# Patient Record
Sex: FEMALE | Race: WHITE | Employment: UNEMPLOYED | URBAN - METROPOLITAN AREA
[De-identification: names, ages, dates, MRNs, and addresses within clinical notes are randomized per-mention and may not be internally consistent; named-entity substitution may affect disease eponyms.]

---

## 2023-10-17 ENCOUNTER — HOSPITAL ENCOUNTER (OUTPATIENT)
Age: 53
Setting detail: THERAPIES SERIES
Discharge: HOME | End: 2023-10-17
Payer: MEDICAID

## 2023-10-17 PROCEDURE — 97165 OT EVAL LOW COMPLEX 30 MIN: CPT

## 2023-10-17 PROCEDURE — 97110 THERAPEUTIC EXERCISES: CPT

## 2023-10-17 NOTE — CONSULTS
compression  [id number]                [x]  ADL training  02357  []  Ultrasound        [id number]    []  Neuromuscular Re-education  [id number]  []  Electrical Stimulation Attended  [id number]    [x]  Manual Therapy  17076  Orthotic    []  Fit  [id number]     []  Train [id number]    [x]  Instruction in HEP        Prosthetic    []  Fit [id number]      []  Train [id number]    []  Cognitive Interventions, (first 15 min 29192, subsequent 15 min 31664)  [x]  Cold/hotpack      []  Massage   42798           []  Medication allergies reviewed for use of    Dexamethasone Sodium Phosphate 4mg/ml     with iontophoresis treatments. Pt is not allergic. Treatment Plan:  Frequency: 2 x/week for 20 visits     Today's Treatment:  Modalities:n/a       Exercise reps resistance comments completed                 Tendon glides 3x10 AROM  X   Chin tuck 3x10 AROM  X   Putty squeeze and pinches 3x10 tan  X     Other:   Access Code: [email or web address]  URL: [email or web address]  Date: 10/17/2023  Prepared by: Jarvis Duckworth    Specific Instructions for Next Treatment:     Evaluation Complexity:  History (Personal factors, comorbidities) [x]  0 []  1-2 []  3+   Exam (limitations, restrictions) [x]  1-2 []  3 []  4+   Decision Making [x]  Low []  Moderate []  High   ? [x]  Low Complexity []  Moderate   Complexity []  High Complexity      Treatment Charges: Mins Units Time In/Out   Evaluation  []  High  []  Moderate  [x]  Low  35  1    [x]  Ther Exercise 15 1    []  Manual Therapy      []  Ther Activities      []  Orthotic fit/train      []  Orthotic recheck      []        Total Treatment time 50 min       Time In: 1500   Time out: 1550     Electronically signed by Jarvis Duckworth OT on 10/17/2023 at 2:05 PM    Physician Signature: _________________________ Date: _______________  By signing above or cosigning this note, I have reviewed this plan of care and certify a need for medically necessary rehabilitation services.      *PLEASE SIGN ABOVE AND FAX BACK ALL PAGES*

## 2023-10-20 ENCOUNTER — HOSPITAL ENCOUNTER (OUTPATIENT)
Age: 53
Setting detail: THERAPIES SERIES
Discharge: HOME | End: 2023-10-20
Payer: MEDICAID

## 2023-10-20 PROCEDURE — 97110 THERAPEUTIC EXERCISES: CPT

## 2023-10-20 NOTE — FLOWSHEET NOTE
[] [facility] ([facility]) [facility] CENTER &  Therapy  [address].  [phone]  F: [phone] [] 204 [facility]  [address]   [address]  P: [phone]  F: [phone] [] [place]  P: [phone]  F: [phone] [x] [place].   P: [phone]  F: [phone] [] [address] [facility] [address]  [address]  [place]: [phone]   F: [phone]     Occupational Therapy Daily Treatment Note    Date:  10/20/2023  Patient Name:  Carlin Acevedo    :  1970  MRN: [id number]  Referring Provider:  ARMANI Laura NP   Insurance: Other [facility], auth needed after 30 visits  Medical Diagnosis: right hand and thumb weakness. R29.898        Rehab Codes: numbness R20.2,, pain in right hand M79.641,, or pain in right finger(s) M79.644,  Onset Date:  (3 years ago)                      Next  [address] [place] [address]: none scheduled at this time  Visit# / total visits: ; Progress note for Medicare patient due at visit 10    Cancels/No Shows: 0/0      Subjective:    Pain:  [x] Yes  [] No Location:  right hand Pain Rating: (0-10 scale) 2/10  Pain altered Tx:  [] No  [x] Yes  Action: breaks as needed with ex's  Pt Comments: Patient told OT that she sleeps with right hand in fist/ flexed and pushed against her trunk all night. She has been doing this for 30 years. Precautions: n/a    Objective:   Today's Treatment:  Modalities:n/a        Exercise reps resistance comments completed    clips  1x up and down Yellow-black     X    flexbar  3x10  red Ext/flex/sup/pron/UD/RD  X    Tendon glides 3x10 AROM   X   Chin tuck 3x10 AROM   X   Putty squeeze and pinches 3x10 tan   X   web 3x10 red Push, squeeze, twist X      Other:

## 2023-10-25 ENCOUNTER — APPOINTMENT (OUTPATIENT)
Age: 53
End: 2023-10-25
Payer: MEDICAID

## 2023-10-27 ENCOUNTER — HOSPITAL ENCOUNTER (OUTPATIENT)
Age: 53
Setting detail: THERAPIES SERIES
Discharge: HOME | End: 2023-10-27
Payer: MEDICAID

## 2023-10-27 PROCEDURE — 97022 WHIRLPOOL THERAPY: CPT

## 2023-10-27 PROCEDURE — 97110 THERAPEUTIC EXERCISES: CPT

## 2023-10-27 NOTE — FLOWSHEET NOTE
[] [facility] ([facility]) -   [facility] CENTER &  Therapy  [address] [place] [address].  [phone]  F: [phone] [] [address] [facility]  [address]   [address]  P: [phone]  F: [phone] [] [place]  P: [phone]  F: [phone] [x] [place].   P: [phone]  F: [phone] [] [address] [facility] [address]  [address]  [place]: [phone]   F: [phone]     Occupational Therapy Daily Treatment Note    Date:  10/27/2023  Patient Name:  Bg Hernandez    :  1970  MRN: [id number]  Referring Provider:  ARMANI Reilly NP   Insurance: Other [facility], auth needed after 30 visits  Medical Diagnosis: right hand and thumb weakness. R29.898        Rehab Codes: numbness R20.2,, pain in right hand M79.641,, or pain in right finger(s) M79.644,  Onset Date:  (3 years ago)                      Next Dr. Laz Shay: none scheduled at this time  Visit# / total visits: 3/20; Progress note for Medicare patient due at visit 10    Cancels/No Shows: 0/0      Subjective:    Pain:  [x] Yes  [] No Location:  right hand Pain Rating: (0-10 scale) 3/10  Pain altered Tx:  [] No  [x] Yes  Action: Fluido therapy,  breaks as needed with ex's  Pt Comments: Reports increased soreness after last treatment session. Did resolve after 1-2 days. Patient states that she is not pushing right fist into trunk while sleeping, she uses LUE now. Precautions: n/a    Objective:   Today's Treatment:  Modalities: fluido therapy to RUE      Exercise reps resistance comments completed    clips  1x up and down Yellow-black     X    flexbar  3x10  red Ext/flex/sup/pron/UD/RD  X    Tendon glides 3x10 AROM HEP  -   Chin tuck 3x10 AROM  HEP -   Putty squeeze

## 2023-11-01 ENCOUNTER — HOSPITAL ENCOUNTER (OUTPATIENT)
Age: 53
Setting detail: THERAPIES SERIES
Discharge: HOME | End: 2023-11-01
Payer: MEDICAID

## 2023-11-01 PROCEDURE — 97022 WHIRLPOOL THERAPY: CPT

## 2023-11-01 PROCEDURE — 97110 THERAPEUTIC EXERCISES: CPT

## 2023-11-01 NOTE — FLOWSHEET NOTE
[] [address] CENTER &  Therapy  [address] [facility] [place] [facility].  [phone]  F: [phone] [] 204 [facility]  [address]   [address]  P: [phone]  F: [phone] [] [place]  P: [phone]  F: [phone] [x] [place].   P: [phone]  F: [phone] [] [address] [facility]  [address]  [place]: [phone]   F: [phone]     Occupational Therapy Daily Treatment Note    Date:  2023  Patient Name:  Timbo Tomlinson    :  1970  MRN: [id number]  Referring Provider:  ARMANI Torrez NP   Insurance: Other [facility], auth needed after 30 visits  Medical Diagnosis: right hand and thumb weakness. R29.898        Rehab Codes: numbness R20.2,, pain in right hand M79.641,, or pain in right finger(s) M79.644,  Onset Date:  (3 years ago)                      Next Dr. Celestine Davison: none scheduled at this time  Visit# / total visits: ; Progress note for Medicare patient due at visit 10    Cancels/No Shows: 0/0      Subjective:    Pain:  [] Yes  [x] No Location:  right hand Pain Rating: (0-10 scale) 0/10  Pain altered Tx:  [] No  [x] Yes  Action: Fluido therapy,  breaks as needed with ex's  Pt Comments: Increased pain, especially in shoulder after playing Wii last weekend. Pain has reduced back to normal by today (5 days later)    Precautions: n/a    Objective:   Today's Treatment:  Modalities: fluido therapy to RUE      Exercise reps resistance comments completed    clips  1x up and down Yellow-black     X    flexbar  3x10  red Ext/flex/sup/pron/UD/RD  -   Tendon glides 3x10 AROM HEP  -   Chin tuck 3x10 AROM  HEP -   Putty squeeze and pinches 3x10 tan HEP  -   web 3x10 red Push, squeeze,

## 2023-11-03 ENCOUNTER — HOSPITAL ENCOUNTER (OUTPATIENT)
Age: 53
Setting detail: THERAPIES SERIES
Discharge: HOME | End: 2023-11-03
Payer: MEDICAID

## 2023-11-03 NOTE — SIGNIFICANT EVENT
[] [address] [facility]. Occupational Therapy       [address]       Phone: [phone]       Fax: [phone] [] [facility] at [facility]       Phone: [phone]       Fax: [phone] [address] [facility]. Phone: [phone]  Fax: [phone]           Occupational Therapy Cancel/No Show note    Date: 11/3/2023  Patient: Mariam Valentine  : 1970  MRN: [id number]  Cancels/No Shows to date:     For today's appointment patient:  [x]  Cancelled  []  Rescheduled appointment  []  No-show     Reason given by patient:  []  Patient ill  []  Conflicting appointment  []  No transportation    []  Conflict with work  []  No reason given  [x]  Other:     Comments:  family emergency. Patient has more appointments scheduled.     Electronically signed by: Antwon Castillo OT

## 2023-11-07 ENCOUNTER — HOSPITAL ENCOUNTER (OUTPATIENT)
Age: 53
Setting detail: THERAPIES SERIES
Discharge: HOME | End: 2023-11-07
Payer: MEDICAID

## 2023-11-07 PROCEDURE — 97022 WHIRLPOOL THERAPY: CPT

## 2023-11-07 PROCEDURE — 97110 THERAPEUTIC EXERCISES: CPT

## 2023-11-07 NOTE — FLOWSHEET NOTE
by performance with correct form without cues. Patient Goals: improve strength of right hand if possible      Pt. Education:  [] Yes  [x] No  [] Reviewed Prior HEP/Ed  Method of Education: [] Verbal  [] Demo  [] Written  Re:  Comprehension of Education:  [] Verbalizes understanding. [] Demonstrates understanding. [] Needs review. [] Demonstrates/verbalizes HEP/Ed previously given. Plan: [x] Continue current frequency toward short and long term goals.   [] Specific Instructions for subsequent treatments:    [] Other:       Time In: 1430 Time Out: 4534        Treatment Charges: Mins Units ([facility]) Time In/Out:   [x]  Modalities Fluidotherapy  10 1    []  Ultrasound      [x]  Ther Exercise 35 2    []  Manual Therapy      []  Ther Activities      []  Orthotic fit/train      []  Orthotic recheck      []  Other      Total Treatment time 45         Electronically signed by:  Traci Roach OT

## 2023-11-09 ENCOUNTER — HOSPITAL ENCOUNTER (OUTPATIENT)
Age: 53
Setting detail: THERAPIES SERIES
Discharge: HOME | End: 2023-11-09
Payer: MEDICAID

## 2023-11-09 PROCEDURE — 97022 WHIRLPOOL THERAPY: CPT

## 2023-11-09 PROCEDURE — 97110 THERAPEUTIC EXERCISES: CPT

## 2023-11-09 NOTE — FLOWSHEET NOTE
squeeze, twist X   Paper crumples 3x  HEP -   ROM arc Back and forth x1 1# wrist weight Taking RB every 5 rings -   UE theraband ex's  Flex/ext/ABD/Ext rot, elb flexion, wrist flex/ext 3x10 red HEP -   P/u foam w/clip 1 series Blue 2/3, green 1/3  X   Velcro board x5 small cylinder  X      Other:   Access Code: [email or web address]  URL: [employer]/  Date: 10/17/2023  Prepared by: Arlet Trejo    Access Code: [email or web address] UE theraband program  URL: [employer]/  Date: 11/01/2023  Prepared by: Arlet Trejo     Specific Instructions for Next Treatment: Fluido, sot tissue mobilization, strength ex's         MMT Right MMT Left RIGHT  11/09/2023 LEFT  11/09/2023   Shoulder             Flexion     4-/5 4/5 4+/5 5/5   Extension             Adduction         4+/5 4+/5   Internal Rotation             External Rotation             Elbow             Flexion     3-/5 4/5 4+/5 5/5   Extension             Forearm Pronation (80-90)             Forearm Supination (80-90)              Wrist             Flexion (70-80)     3-/5 4-/5 4+/5 4+/5   Extension (60-70)     3-/5 4/5 4+/5 4+/5   Radial Deviation (20-25)             Ulnar Deviation (30-40)           STRENGTH                   Right   (pounds) Left   (pounds) RIGHT  11/09/2023    position 2 20 (55%) 36 35   Lateral pinch 10 (66%) 15 8   2 point pinch 7 (58%) 12 8   3 jaw pinch 7.5 (47%) 16 8      Bilateral  strength is normally symmetrical or up to 10% stronger on the dominant extremity, depending on the individual's physically activity level. Assessment: [x] Progressing toward goals. Initiated treatment with fluidotherapy to RUE for improving pain, ROM and circulation. Re-tested BUE arm strength and right  and pinch strength. Improvement in all areas except pinch strength. Completed above stated ex's with short breaks as needed, focusing mainly on pinch strength. [] No change.   [] Other     [x] Patient would benefit from skilled

## 2023-11-14 ENCOUNTER — HOSPITAL ENCOUNTER (OUTPATIENT)
Age: 53
Setting detail: THERAPIES SERIES
Discharge: HOME | End: 2023-11-14
Payer: MEDICAID

## 2023-11-14 NOTE — SIGNIFICANT EVENT
[] [address] [facility]. Occupational Therapy       [address]       Phone: [phone]       Fax: [phone] [] [facility] at [facility]       Phone: [phone]       Fax: [phone] [address] [facility]. Phone: [phone]  Fax: [phone]           Occupational Therapy Cancel/No Show note    Date: 2023  Patient: Mariam Valentine  : 1970  MRN: [id number]  Cancels/No Shows to date:     For today's appointment patient:  [x]  Cancelled  []  Rescheduled appointment  []  No-show     Reason given by patient:  []  Patient ill  []  Conflicting appointment  []  No transportation    []  Conflict with work  []  No reason given  [x]  Other:     Comments:  Patient called to cancel stating she was bit by a spider.     Electronically signed by: Antwon Castillo OT

## 2023-11-17 ENCOUNTER — HOSPITAL ENCOUNTER (OUTPATIENT)
Age: 53
Setting detail: THERAPIES SERIES
Discharge: HOME | End: 2023-11-17
Payer: MEDICAID

## 2023-11-17 PROCEDURE — 97110 THERAPEUTIC EXERCISES: CPT

## 2023-11-17 PROCEDURE — 97022 WHIRLPOOL THERAPY: CPT

## 2023-11-17 NOTE — FLOWSHEET NOTE
[] [facility] ([facility]) [facility] CENTER &  Therapy  [address].  [phone]  F: [phone] [] [address] [facility]  [address]   [address]  P: [phone]  F: [phone] [] [place]  P: [phone]  F: [phone] [x] [address] [facility].   P: [phone]  F: [phone] [] [address] [facility] [address]  [address]  [place]: [phone]   F: [phone]     Occupational Therapy Daily Treatment Note    Date:  2023  Patient Name:  Arturo Voss    :  1970  MRN: [id number]  Referring Provider:  ARMANI Du NP   Insurance: Other [facility], auth needed after 30 visits  Medical Diagnosis: right hand and thumb weakness. R29.898        Rehab Codes: numbness R20.2,, pain in right hand M79.641,, or pain in right finger(s) M79.644,  Onset Date:  (3 years ago)                      Next Dr. Faheem Blevins: none scheduled at this time  Visit# / total visits: ; Progress note for Medicare patient due at visit 10    Cancels/No Shows: 0/0      Subjective:    Pain:  [] Yes  [x] No Location:  right hand, middle of palm Pain Rating: (0-10 scale) 4/10  Pain altered Tx:  [] No  [x] Yes  Action: Fluido therapy,  breaks as needed with ex's  Pt: Not noticing any problems with hand usually, but had some trouble zipping up boots this morning  Precautions: n/a    Objective:   Today's Treatment:  Modalities: fluido therapy to RUE      Exercise reps resistance comments completed    clips  1x up and down Yellow-black     -    flexbar  3x10  red Ext/flex/sup/pron/UD/RD  -   Tendon glides 3x10 AROM HEP  -   Chin tuck 3x10 AROM  HEP -   Putty squeeze and pinches 3x10 tan HEP  -   web 3x10 red Push, squeeze, twist X   Paper crumples

## 2023-11-28 ENCOUNTER — HOSPITAL ENCOUNTER (OUTPATIENT)
Age: 53
Setting detail: THERAPIES SERIES
Discharge: HOME | End: 2023-11-28
Payer: MEDICAID

## 2023-11-28 NOTE — SIGNIFICANT EVENT
[] [address] [facility]. Occupational Therapy       [address]       Phone: [phone]       Fax: [phone] [] [facility] at [facility]       Phone: [phone]       Fax: [phone] [address] [facility]. Phone: [phone]  Fax: [phone]           Occupational Therapy Cancel/No Show note    Date: 2023  Patient: Corina Vazquez  : 1970  MRN: [id number]  Cancels/No Shows to date: 3/0    For today's appointment patient:  [x]  Cancelled  []  Rescheduled appointment  []  No-show     Reason given by patient:  []  Patient ill  [x]  Conflicting appointment  []  No transportation    []  Conflict with work  []  No reason given  []  Other:     Comments:  Patient does not have anymore appointments scheduled. She will need to call to set up appointments if she would like to return to therapy.     Electronically signed by: Nathan Medina, OT

## 2025-03-05 PROBLEM — N26.1 ATROPHY OF RIGHT KIDNEY: Status: ACTIVE | Noted: 2025-03-05

## 2025-03-05 PROBLEM — I42.6 ALCOHOLIC CARDIOMYOPATHY (HCC): Status: ACTIVE | Noted: 2025-03-05

## 2025-03-05 PROBLEM — N18.2 CKD (CHRONIC KIDNEY DISEASE), STAGE II: Status: ACTIVE | Noted: 2025-03-05

## 2025-03-05 PROBLEM — M25.549 ARTHRALGIA OF HAND: Status: ACTIVE | Noted: 2025-03-05

## 2025-03-05 PROBLEM — I10 PRIMARY HYPERTENSION: Status: ACTIVE | Noted: 2025-03-05

## 2025-04-02 PROBLEM — N28.1 COMPLEX RENAL CYST: Status: ACTIVE | Noted: 2025-04-02
